# Patient Record
Sex: MALE | Race: WHITE | Employment: FULL TIME | URBAN - METROPOLITAN AREA
[De-identification: names, ages, dates, MRNs, and addresses within clinical notes are randomized per-mention and may not be internally consistent; named-entity substitution may affect disease eponyms.]

---

## 2018-11-12 ENCOUNTER — TRANSFERRED RECORDS (OUTPATIENT)
Dept: HEALTH INFORMATION MANAGEMENT | Facility: CLINIC | Age: 38
End: 2018-11-12

## 2019-07-31 ENCOUNTER — HOSPITAL ENCOUNTER (OUTPATIENT)
Facility: CLINIC | Age: 39
Discharge: HOME | End: 2019-07-31
Attending: PHYSICIAN ASSISTANT | Admitting: SURGERY
Payer: COMMERCIAL

## 2019-07-31 ENCOUNTER — ANESTHESIA (OUTPATIENT)
Dept: SURGERY | Facility: CLINIC | Age: 39
End: 2019-07-31
Payer: COMMERCIAL

## 2019-07-31 ENCOUNTER — APPOINTMENT (OUTPATIENT)
Dept: CT IMAGING | Facility: CLINIC | Age: 39
End: 2019-07-31
Attending: PHYSICIAN ASSISTANT
Payer: COMMERCIAL

## 2019-07-31 ENCOUNTER — ANESTHESIA EVENT (OUTPATIENT)
Dept: SURGERY | Facility: CLINIC | Age: 39
End: 2019-07-31
Payer: COMMERCIAL

## 2019-07-31 VITALS
OXYGEN SATURATION: 100 % | DIASTOLIC BLOOD PRESSURE: 70 MMHG | RESPIRATION RATE: 18 BRPM | WEIGHT: 182 LBS | HEART RATE: 65 BPM | SYSTOLIC BLOOD PRESSURE: 105 MMHG | TEMPERATURE: 98 F

## 2019-07-31 DIAGNOSIS — K35.30 ACUTE APPENDICITIS WITH LOCALIZED PERITONITIS, WITHOUT PERFORATION, ABSCESS, OR GANGRENE: Primary | ICD-10-CM

## 2019-07-31 LAB
ALBUMIN UR-MCNC: NEGATIVE MG/DL
ANION GAP SERPL CALCULATED.3IONS-SCNC: 6 MMOL/L (ref 3–14)
APPEARANCE UR: CLEAR
BASOPHILS # BLD AUTO: 0 10E9/L (ref 0–0.2)
BASOPHILS NFR BLD AUTO: 0.2 %
BILIRUB UR QL STRIP: NEGATIVE
BUN SERPL-MCNC: 17 MG/DL (ref 7–30)
CALCIUM SERPL-MCNC: 9.2 MG/DL (ref 8.5–10.1)
CHLORIDE SERPL-SCNC: 104 MMOL/L (ref 94–109)
CO2 SERPL-SCNC: 30 MMOL/L (ref 20–32)
COLOR UR AUTO: ABNORMAL
CREAT SERPL-MCNC: 1.15 MG/DL (ref 0.66–1.25)
DIFFERENTIAL METHOD BLD: ABNORMAL
EOSINOPHIL # BLD AUTO: 0 10E9/L (ref 0–0.7)
EOSINOPHIL NFR BLD AUTO: 0.2 %
ERYTHROCYTE [DISTWIDTH] IN BLOOD BY AUTOMATED COUNT: 11.8 % (ref 10–15)
GFR SERPL CREATININE-BSD FRML MDRD: 78 ML/MIN/{1.73_M2}
GLUCOSE SERPL-MCNC: 94 MG/DL (ref 70–99)
GLUCOSE UR STRIP-MCNC: NEGATIVE MG/DL
HCT VFR BLD AUTO: 45.2 % (ref 40–53)
HGB BLD-MCNC: 15.5 G/DL (ref 13.3–17.7)
HGB UR QL STRIP: NEGATIVE
IMM GRANULOCYTES # BLD: 0.1 10E9/L (ref 0–0.4)
IMM GRANULOCYTES NFR BLD: 0.4 %
KETONES UR STRIP-MCNC: NEGATIVE MG/DL
LEUKOCYTE ESTERASE UR QL STRIP: NEGATIVE
LYMPHOCYTES # BLD AUTO: 1.7 10E9/L (ref 0.8–5.3)
LYMPHOCYTES NFR BLD AUTO: 13.9 %
MCH RBC QN AUTO: 30.3 PG (ref 26.5–33)
MCHC RBC AUTO-ENTMCNC: 34.3 G/DL (ref 31.5–36.5)
MCV RBC AUTO: 89 FL (ref 78–100)
MONOCYTES # BLD AUTO: 0.6 10E9/L (ref 0–1.3)
MONOCYTES NFR BLD AUTO: 5.1 %
MUCOUS THREADS #/AREA URNS LPF: PRESENT /LPF
NEUTROPHILS # BLD AUTO: 9.5 10E9/L (ref 1.6–8.3)
NEUTROPHILS NFR BLD AUTO: 80.2 %
NITRATE UR QL: NEGATIVE
NRBC # BLD AUTO: 0 10*3/UL
NRBC BLD AUTO-RTO: 0 /100
PH UR STRIP: 7 PH (ref 5–7)
PLATELET # BLD AUTO: 215 10E9/L (ref 150–450)
POTASSIUM SERPL-SCNC: 3.7 MMOL/L (ref 3.4–5.3)
RADIOLOGIST FLAGS: ABNORMAL
RBC # BLD AUTO: 5.11 10E12/L (ref 4.4–5.9)
RBC #/AREA URNS AUTO: <1 /HPF (ref 0–2)
SODIUM SERPL-SCNC: 140 MMOL/L (ref 133–144)
SOURCE: ABNORMAL
SP GR UR STRIP: 1.01 (ref 1–1.03)
UROBILINOGEN UR STRIP-MCNC: NORMAL MG/DL (ref 0–2)
WBC # BLD AUTO: 11.9 10E9/L (ref 4–11)
WBC #/AREA URNS AUTO: 2 /HPF (ref 0–5)

## 2019-07-31 PROCEDURE — 71000027 ZZH RECOVERY PHASE 2 EACH 15 MINS: Performed by: SURGERY

## 2019-07-31 PROCEDURE — 74177 CT ABD & PELVIS W/CONTRAST: CPT

## 2019-07-31 PROCEDURE — 25800030 ZZH RX IP 258 OP 636: Performed by: ANESTHESIOLOGY

## 2019-07-31 PROCEDURE — 25000125 ZZHC RX 250: Performed by: NURSE ANESTHETIST, CERTIFIED REGISTERED

## 2019-07-31 PROCEDURE — 99204 OFFICE O/P NEW MOD 45 MIN: CPT | Mod: 57 | Performed by: SURGERY

## 2019-07-31 PROCEDURE — 25000128 H RX IP 250 OP 636: Performed by: SURGERY

## 2019-07-31 PROCEDURE — 85025 COMPLETE CBC W/AUTO DIFF WBC: CPT | Performed by: PHYSICIAN ASSISTANT

## 2019-07-31 PROCEDURE — 36000058 ZZH SURGERY LEVEL 3 EA 15 ADDTL MIN: Performed by: SURGERY

## 2019-07-31 PROCEDURE — 96375 TX/PRO/DX INJ NEW DRUG ADDON: CPT

## 2019-07-31 PROCEDURE — 36000056 ZZH SURGERY LEVEL 3 1ST 30 MIN: Performed by: SURGERY

## 2019-07-31 PROCEDURE — 37000008 ZZH ANESTHESIA TECHNICAL FEE, 1ST 30 MIN: Performed by: SURGERY

## 2019-07-31 PROCEDURE — 25000132 ZZH RX MED GY IP 250 OP 250 PS 637: Performed by: SURGERY

## 2019-07-31 PROCEDURE — 80048 BASIC METABOLIC PNL TOTAL CA: CPT | Performed by: PHYSICIAN ASSISTANT

## 2019-07-31 PROCEDURE — 25000128 H RX IP 250 OP 636: Performed by: ANESTHESIOLOGY

## 2019-07-31 PROCEDURE — 25000128 H RX IP 250 OP 636: Performed by: NURSE ANESTHETIST, CERTIFIED REGISTERED

## 2019-07-31 PROCEDURE — 88304 TISSUE EXAM BY PATHOLOGIST: CPT | Mod: 26 | Performed by: SURGERY

## 2019-07-31 PROCEDURE — 27210794 ZZH OR GENERAL SUPPLY STERILE: Performed by: SURGERY

## 2019-07-31 PROCEDURE — 81001 URINALYSIS AUTO W/SCOPE: CPT | Performed by: EMERGENCY MEDICINE

## 2019-07-31 PROCEDURE — 96374 THER/PROPH/DIAG INJ IV PUSH: CPT | Mod: 59

## 2019-07-31 PROCEDURE — 71000012 ZZH RECOVERY PHASE 1 LEVEL 1 FIRST HR: Performed by: SURGERY

## 2019-07-31 PROCEDURE — 99285 EMERGENCY DEPT VISIT HI MDM: CPT | Mod: 25

## 2019-07-31 PROCEDURE — 88304 TISSUE EXAM BY PATHOLOGIST: CPT | Performed by: SURGERY

## 2019-07-31 PROCEDURE — 40000306 ZZH STATISTIC PRE PROC ASSESS II: Performed by: SURGERY

## 2019-07-31 PROCEDURE — 25000128 H RX IP 250 OP 636: Performed by: PHYSICIAN ASSISTANT

## 2019-07-31 PROCEDURE — 44970 LAPAROSCOPY APPENDECTOMY: CPT | Performed by: SURGERY

## 2019-07-31 PROCEDURE — 37000009 ZZH ANESTHESIA TECHNICAL FEE, EACH ADDTL 15 MIN: Performed by: SURGERY

## 2019-07-31 RX ORDER — HYDRALAZINE HYDROCHLORIDE 20 MG/ML
2.5-5 INJECTION INTRAMUSCULAR; INTRAVENOUS EVERY 10 MIN PRN
Status: DISCONTINUED | OUTPATIENT
Start: 2019-07-31 | End: 2019-07-31 | Stop reason: HOSPADM

## 2019-07-31 RX ORDER — METOCLOPRAMIDE HYDROCHLORIDE 5 MG/ML
10 INJECTION INTRAMUSCULAR; INTRAVENOUS EVERY 6 HOURS PRN
Status: DISCONTINUED | OUTPATIENT
Start: 2019-07-31 | End: 2019-07-31 | Stop reason: HOSPADM

## 2019-07-31 RX ORDER — KETOROLAC TROMETHAMINE 30 MG/ML
30 INJECTION, SOLUTION INTRAMUSCULAR; INTRAVENOUS EVERY 6 HOURS PRN
Status: DISCONTINUED | OUTPATIENT
Start: 2019-07-31 | End: 2019-07-31 | Stop reason: HOSPADM

## 2019-07-31 RX ORDER — MEPERIDINE HYDROCHLORIDE 25 MG/ML
12.5 INJECTION INTRAMUSCULAR; INTRAVENOUS; SUBCUTANEOUS
Status: DISCONTINUED | OUTPATIENT
Start: 2019-07-31 | End: 2019-07-31 | Stop reason: HOSPADM

## 2019-07-31 RX ORDER — GLYCOPYRROLATE 0.2 MG/ML
INJECTION, SOLUTION INTRAMUSCULAR; INTRAVENOUS PRN
Status: DISCONTINUED | OUTPATIENT
Start: 2019-07-31 | End: 2019-07-31

## 2019-07-31 RX ORDER — IOPAMIDOL 755 MG/ML
100 INJECTION, SOLUTION INTRAVASCULAR ONCE
Status: COMPLETED | OUTPATIENT
Start: 2019-07-31 | End: 2019-07-31

## 2019-07-31 RX ORDER — ONDANSETRON 2 MG/ML
4 INJECTION INTRAMUSCULAR; INTRAVENOUS EVERY 30 MIN PRN
Status: DISCONTINUED | OUTPATIENT
Start: 2019-07-31 | End: 2019-07-31 | Stop reason: HOSPADM

## 2019-07-31 RX ORDER — NEOSTIGMINE METHYLSULFATE 1 MG/ML
VIAL (ML) INJECTION PRN
Status: DISCONTINUED | OUTPATIENT
Start: 2019-07-31 | End: 2019-07-31

## 2019-07-31 RX ORDER — IBUPROFEN 800 MG/1
800 TABLET, FILM COATED ORAL EVERY 6 HOURS PRN
Qty: 30 TABLET | Refills: 0 | Status: SHIPPED | OUTPATIENT
Start: 2019-07-31 | End: 2019-12-10

## 2019-07-31 RX ORDER — HEPARIN SODIUM 5000 [USP'U]/.5ML
5000 INJECTION, SOLUTION INTRAVENOUS; SUBCUTANEOUS
Status: DISCONTINUED | OUTPATIENT
Start: 2019-07-31 | End: 2019-07-31 | Stop reason: HOSPADM

## 2019-07-31 RX ORDER — AMOXICILLIN 250 MG
1 CAPSULE ORAL 2 TIMES DAILY
Qty: 30 TABLET | Refills: 0 | Status: SHIPPED | OUTPATIENT
Start: 2019-07-31 | End: 2019-11-14

## 2019-07-31 RX ORDER — SODIUM CHLORIDE, SODIUM LACTATE, POTASSIUM CHLORIDE, CALCIUM CHLORIDE 600; 310; 30; 20 MG/100ML; MG/100ML; MG/100ML; MG/100ML
INJECTION, SOLUTION INTRAVENOUS CONTINUOUS
Status: DISCONTINUED | OUTPATIENT
Start: 2019-07-31 | End: 2019-07-31 | Stop reason: HOSPADM

## 2019-07-31 RX ORDER — CEFOXITIN 2 G/1
2 INJECTION, POWDER, FOR SOLUTION INTRAVENOUS ONCE
Status: COMPLETED | OUTPATIENT
Start: 2019-07-31 | End: 2019-07-31

## 2019-07-31 RX ORDER — ONDANSETRON 2 MG/ML
INJECTION INTRAMUSCULAR; INTRAVENOUS PRN
Status: DISCONTINUED | OUTPATIENT
Start: 2019-07-31 | End: 2019-07-31

## 2019-07-31 RX ORDER — FENTANYL CITRATE 50 UG/ML
25-50 INJECTION, SOLUTION INTRAMUSCULAR; INTRAVENOUS
Status: DISCONTINUED | OUTPATIENT
Start: 2019-07-31 | End: 2019-07-31 | Stop reason: HOSPADM

## 2019-07-31 RX ORDER — BUPIVACAINE HYDROCHLORIDE 5 MG/ML
INJECTION, SOLUTION EPIDURAL; INTRACAUDAL PRN
Status: DISCONTINUED | OUTPATIENT
Start: 2019-07-31 | End: 2019-07-31 | Stop reason: HOSPADM

## 2019-07-31 RX ORDER — LIDOCAINE HYDROCHLORIDE 10 MG/ML
INJECTION, SOLUTION INFILTRATION; PERINEURAL PRN
Status: DISCONTINUED | OUTPATIENT
Start: 2019-07-31 | End: 2019-07-31

## 2019-07-31 RX ORDER — HYDROMORPHONE HYDROCHLORIDE 1 MG/ML
.3-.5 INJECTION, SOLUTION INTRAMUSCULAR; INTRAVENOUS; SUBCUTANEOUS EVERY 10 MIN PRN
Status: DISCONTINUED | OUTPATIENT
Start: 2019-07-31 | End: 2019-07-31 | Stop reason: HOSPADM

## 2019-07-31 RX ORDER — ONDANSETRON 2 MG/ML
4 INJECTION INTRAMUSCULAR; INTRAVENOUS ONCE
Status: COMPLETED | OUTPATIENT
Start: 2019-07-31 | End: 2019-07-31

## 2019-07-31 RX ORDER — KETOROLAC TROMETHAMINE 30 MG/ML
30 INJECTION, SOLUTION INTRAMUSCULAR; INTRAVENOUS ONCE
Status: DISCONTINUED | OUTPATIENT
Start: 2019-07-31 | End: 2019-07-31 | Stop reason: HOSPADM

## 2019-07-31 RX ORDER — ACETAMINOPHEN 325 MG/1
975 TABLET ORAL ONCE
Status: DISCONTINUED | OUTPATIENT
Start: 2019-07-31 | End: 2019-07-31 | Stop reason: HOSPADM

## 2019-07-31 RX ORDER — ONDANSETRON 4 MG/1
4 TABLET, ORALLY DISINTEGRATING ORAL EVERY 30 MIN PRN
Status: DISCONTINUED | OUTPATIENT
Start: 2019-07-31 | End: 2019-07-31 | Stop reason: HOSPADM

## 2019-07-31 RX ORDER — PROPOFOL 10 MG/ML
INJECTION, EMULSION INTRAVENOUS PRN
Status: DISCONTINUED | OUTPATIENT
Start: 2019-07-31 | End: 2019-07-31

## 2019-07-31 RX ORDER — METOCLOPRAMIDE 10 MG/1
10 TABLET ORAL EVERY 6 HOURS PRN
Status: DISCONTINUED | OUTPATIENT
Start: 2019-07-31 | End: 2019-07-31 | Stop reason: HOSPADM

## 2019-07-31 RX ORDER — DEXAMETHASONE SODIUM PHOSPHATE 4 MG/ML
4 INJECTION, SOLUTION INTRA-ARTICULAR; INTRALESIONAL; INTRAMUSCULAR; INTRAVENOUS; SOFT TISSUE EVERY 10 MIN PRN
Status: DISCONTINUED | OUTPATIENT
Start: 2019-07-31 | End: 2019-07-31 | Stop reason: HOSPADM

## 2019-07-31 RX ORDER — OXYCODONE HYDROCHLORIDE 5 MG/1
5-10 TABLET ORAL
Qty: 12 TABLET | Refills: 0 | Status: SHIPPED | OUTPATIENT
Start: 2019-07-31 | End: 2019-11-14

## 2019-07-31 RX ORDER — HYDROMORPHONE HYDROCHLORIDE 1 MG/ML
0.5 INJECTION, SOLUTION INTRAMUSCULAR; INTRAVENOUS; SUBCUTANEOUS ONCE
Status: COMPLETED | OUTPATIENT
Start: 2019-07-31 | End: 2019-07-31

## 2019-07-31 RX ORDER — OXYCODONE HYDROCHLORIDE 5 MG/1
10 TABLET ORAL
Status: COMPLETED | OUTPATIENT
Start: 2019-07-31 | End: 2019-07-31

## 2019-07-31 RX ORDER — DIMENHYDRINATE 50 MG/ML
25 INJECTION, SOLUTION INTRAMUSCULAR; INTRAVENOUS
Status: DISCONTINUED | OUTPATIENT
Start: 2019-07-31 | End: 2019-07-31 | Stop reason: HOSPADM

## 2019-07-31 RX ORDER — DEXAMETHASONE SODIUM PHOSPHATE 4 MG/ML
INJECTION, SOLUTION INTRA-ARTICULAR; INTRALESIONAL; INTRAMUSCULAR; INTRAVENOUS; SOFT TISSUE PRN
Status: DISCONTINUED | OUTPATIENT
Start: 2019-07-31 | End: 2019-07-31

## 2019-07-31 RX ORDER — ACETAMINOPHEN 325 MG/1
975 TABLET ORAL EVERY 6 HOURS
Qty: 36 TABLET | Refills: 0 | Status: SHIPPED | OUTPATIENT
Start: 2019-07-31 | End: 2019-12-10

## 2019-07-31 RX ORDER — NALOXONE HYDROCHLORIDE 0.4 MG/ML
.1-.4 INJECTION, SOLUTION INTRAMUSCULAR; INTRAVENOUS; SUBCUTANEOUS
Status: DISCONTINUED | OUTPATIENT
Start: 2019-07-31 | End: 2019-07-31 | Stop reason: HOSPADM

## 2019-07-31 RX ORDER — FENTANYL CITRATE 50 UG/ML
INJECTION, SOLUTION INTRAMUSCULAR; INTRAVENOUS PRN
Status: DISCONTINUED | OUTPATIENT
Start: 2019-07-31 | End: 2019-07-31

## 2019-07-31 RX ADMIN — HYDROMORPHONE HYDROCHLORIDE 0.5 MG: 1 INJECTION, SOLUTION INTRAMUSCULAR; INTRAVENOUS; SUBCUTANEOUS at 15:43

## 2019-07-31 RX ADMIN — CEFOXITIN 2 G: 2 INJECTION, POWDER, FOR SOLUTION INTRAVENOUS at 17:24

## 2019-07-31 RX ADMIN — OXYCODONE HYDROCHLORIDE 10 MG: 5 TABLET ORAL at 20:12

## 2019-07-31 RX ADMIN — PROPOFOL 200 MG: 10 INJECTION, EMULSION INTRAVENOUS at 18:37

## 2019-07-31 RX ADMIN — MIDAZOLAM 2 MG: 1 INJECTION INTRAMUSCULAR; INTRAVENOUS at 18:25

## 2019-07-31 RX ADMIN — FENTANYL CITRATE 100 MCG: 50 INJECTION, SOLUTION INTRAMUSCULAR; INTRAVENOUS at 18:37

## 2019-07-31 RX ADMIN — ONDANSETRON HYDROCHLORIDE 4 MG: 2 INJECTION, SOLUTION INTRAMUSCULAR; INTRAVENOUS at 15:43

## 2019-07-31 RX ADMIN — IOPAMIDOL 92 ML: 755 INJECTION, SOLUTION INTRAVENOUS at 16:16

## 2019-07-31 RX ADMIN — KETOROLAC TROMETHAMINE 30 MG: 30 INJECTION, SOLUTION INTRAMUSCULAR at 18:45

## 2019-07-31 RX ADMIN — GLYCOPYRROLATE 0.2 MG: 0.2 INJECTION, SOLUTION INTRAMUSCULAR; INTRAVENOUS at 18:37

## 2019-07-31 RX ADMIN — DEXAMETHASONE SODIUM PHOSPHATE 4 MG: 4 INJECTION, SOLUTION INTRA-ARTICULAR; INTRALESIONAL; INTRAMUSCULAR; INTRAVENOUS; SOFT TISSUE at 18:37

## 2019-07-31 RX ADMIN — LIDOCAINE HYDROCHLORIDE 50 MG: 10 INJECTION, SOLUTION INFILTRATION; PERINEURAL at 18:37

## 2019-07-31 RX ADMIN — SODIUM CHLORIDE 63 ML: 9 INJECTION, SOLUTION INTRAVENOUS at 16:16

## 2019-07-31 RX ADMIN — GLYCOPYRROLATE 0.4 MG: 0.2 INJECTION, SOLUTION INTRAMUSCULAR; INTRAVENOUS at 19:11

## 2019-07-31 RX ADMIN — Medication 3 MG: at 19:11

## 2019-07-31 RX ADMIN — ROCURONIUM BROMIDE 50 MG: 10 INJECTION INTRAVENOUS at 18:37

## 2019-07-31 RX ADMIN — ONDANSETRON 4 MG: 2 INJECTION INTRAMUSCULAR; INTRAVENOUS at 19:11

## 2019-07-31 RX ADMIN — SODIUM CHLORIDE, POTASSIUM CHLORIDE, SODIUM LACTATE AND CALCIUM CHLORIDE: 600; 310; 30; 20 INJECTION, SOLUTION INTRAVENOUS at 18:25

## 2019-07-31 SDOH — HEALTH STABILITY: MENTAL HEALTH: HOW OFTEN DO YOU HAVE A DRINK CONTAINING ALCOHOL?: 2-4 TIMES A MONTH

## 2019-07-31 SDOH — HEALTH STABILITY: MENTAL HEALTH: HOW OFTEN DO YOU HAVE 6 OR MORE DRINKS ON ONE OCCASION?: NEVER

## 2019-07-31 SDOH — HEALTH STABILITY: MENTAL HEALTH: HOW MANY STANDARD DRINKS CONTAINING ALCOHOL DO YOU HAVE ON A TYPICAL DAY?: 3 OR 4

## 2019-07-31 ASSESSMENT — ENCOUNTER SYMPTOMS
DYSURIA: 1
DIZZINESS: 0
ABDOMINAL PAIN: 1
DIAPHORESIS: 1
NAUSEA: 1
SHORTNESS OF BREATH: 0
HEMATURIA: 0
VOMITING: 0
FEVER: 0
CONSTIPATION: 0

## 2019-07-31 NOTE — H&P
[facility]    History and Physical  Surgery     Date of Admission:  7/31/2019    Assessment & Plan   Surya Cyr is a 39 year old male who presents with abdominal pain    Principal Problem:    Acute appendicitis with localized peritonitis, without perforation, abscess, or gangrene    Assessment: stable    Plan: NPO   IVF   2 g cefoxitin   Sub q heparin   975mg tylenol   30 mg Toradol     Patient to the OR for laparoscopic appendectomy; possible open.  Risks and benefits of the procedure discussed in detail with the patient in the presence of his wife.  Consent signed.       Jamir Lopez D.O.     Code Status   Full Code    Primary Care Physician   Physician No Ref-Primary    Chief Complaint   RLQ Abdominal pain    History is obtained from the patient    History of Present Illness   39-year-old  male presented to the clinic with a 1 day history of worsening 4-6 out of 10 sharp, cramping, right lower quadrant abdominal pain associated with nausea without emesis.  Patient denies any fevers but does report report some chills.  The pain started last night around 9:30 PM and slowly progressed.  He did go to work today, but left early secondary to the worsening discomfort.  He has never had this before.  Nothing seems to make it worse.  Pain medications received in the ED did help.    Past Medical History    I have reviewed this patient's medical history and updated it with pertinent information if needed.   History reviewed. No pertinent past medical history.    Past Surgical History   I have reviewed this patient's surgical history and updated it with pertinent information if needed.  Past Surgical History:   Procedure Laterality Date     VASECTOMY         Prior to Admission Medications   None     Allergies   No Known Allergies    Social History   I have reviewed this patient's social history and updated it with pertinent information if needed. Surya Cyr  reports that he has  never smoked. He has never used smokeless tobacco. He reports that he drinks alcohol. He reports that he does not use drugs.    Family History   I have reviewed this patient's family history and updated it with pertinent information if needed.   History reviewed. No pertinent family history.    Review of Systems   The 10 point Review of Systems is negative other than noted in the HPI.    Physical Exam   Temp: 98.8  F (37.1  C) Temp src: Temporal BP: 124/84 Pulse: 82   Resp: 16 SpO2: 97 %      Vital Signs with Ranges  Temp:  [98.8  F (37.1  C)] 98.8  F (37.1  C)  Pulse:  [82] 82  Resp:  [16] 16  BP: (124)/(84) 124/84  SpO2:  [97 %] 97 %  182 lbs 0 oz  There is no height or weight on file to calculate BMI.    Constitutional: awake, alert, cooperative, no apparent distress, and appears stated age  Eyes: Lids and lashes normal, pupils equal, round and reactive to light, extra ocular muscles intact, sclera clear, conjunctiva normal  ENT: Normocephalic, without obvious abnormality, atraumatic,   Respiratory: No increased work of breathing, good air exchange,   Cardiovascular:  regular rate and rhythm,   GI: soft, non-distended, RLQ rebound tenderness present, voluntary guarding present, no masses palpated, no hepatosplenomegally and hernia absent  Genitounirinary: deferred  Skin: no bruising or bleeding, normal skin color, texture, turgor and no redness, warmth, or swelling  Musculoskeletal: There is no redness, warmth, or swelling of the joints.  Full range of motion noted.  Motor strength is 5 out of 5 all extremities bilaterally.  Tone is normal.  Neurologic: Awake, alert, oriented to name, place and time.  Cranial nerves II-XII are grossly intact.  Motor is 5 out of 5 bilaterally. .  Neuropsychiatric: General: normal, calm and normal eye contact    Data   Results for orders placed or performed during the hospital encounter of 07/31/19 (from the past 24 hour(s))   UA with Microscopic   Result Value Ref Range    Color  Urine Straw     Appearance Urine Clear     Glucose Urine Negative NEG^Negative mg/dL    Bilirubin Urine Negative NEG^Negative    Ketones Urine Negative NEG^Negative mg/dL    Specific Gravity Urine 1.011 1.003 - 1.035    Blood Urine Negative NEG^Negative    pH Urine 7.0 5.0 - 7.0 pH    Protein Albumin Urine Negative NEG^Negative mg/dL    Urobilinogen mg/dL Normal 0.0 - 2.0 mg/dL    Nitrite Urine Negative NEG^Negative    Leukocyte Esterase Urine Negative NEG^Negative    Source Midstream Urine     WBC Urine 2 0 - 5 /HPF    RBC Urine <1 0 - 2 /HPF    Mucous Urine Present (A) NEG^Negative /LPF   CBC with platelets differential   Result Value Ref Range    WBC 11.9 (H) 4.0 - 11.0 10e9/L    RBC Count 5.11 4.4 - 5.9 10e12/L    Hemoglobin 15.5 13.3 - 17.7 g/dL    Hematocrit 45.2 40.0 - 53.0 %    MCV 89 78 - 100 fl    MCH 30.3 26.5 - 33.0 pg    MCHC 34.3 31.5 - 36.5 g/dL    RDW 11.8 10.0 - 15.0 %    Platelet Count 215 150 - 450 10e9/L    Diff Method Automated Method     % Neutrophils 80.2 %    % Lymphocytes 13.9 %    % Monocytes 5.1 %    % Eosinophils 0.2 %    % Basophils 0.2 %    % Immature Granulocytes 0.4 %    Nucleated RBCs 0 0 /100    Absolute Neutrophil 9.5 (H) 1.6 - 8.3 10e9/L    Absolute Lymphocytes 1.7 0.8 - 5.3 10e9/L    Absolute Monocytes 0.6 0.0 - 1.3 10e9/L    Absolute Eosinophils 0.0 0.0 - 0.7 10e9/L    Absolute Basophils 0.0 0.0 - 0.2 10e9/L    Abs Immature Granulocytes 0.1 0 - 0.4 10e9/L    Absolute Nucleated RBC 0.0    Basic metabolic panel   Result Value Ref Range    Sodium 140 133 - 144 mmol/L    Potassium 3.7 3.4 - 5.3 mmol/L    Chloride 104 94 - 109 mmol/L    Carbon Dioxide 30 20 - 32 mmol/L    Anion Gap 6 3 - 14 mmol/L    Glucose 94 70 - 99 mg/dL    Urea Nitrogen 17 7 - 30 mg/dL    Creatinine 1.15 0.66 - 1.25 mg/dL    GFR Estimate 78 >60 mL/min/[1.73_m2]    GFR Estimate If Black >90 >60 mL/min/[1.73_m2]    Calcium 9.2 8.5 - 10.1 mg/dL   CT Abdomen Pelvis w Contrast   Result Value Ref Range    Radiologist  flags Appendicitis (AA)     Narrative    CT ABDOMEN AND PELVIS WITH CONTRAST  7/31/2019 4:23 PM     HISTORY: Nausea, vomiting; ; Right lower quadrant tenderness, assess  for appendicitis    TECHNIQUE:  CT abdomen and pelvis with 92mL Isovue-370 IV. Radiation  dose for this scan was reduced using automated exposure control,  adjustment of the mA and/or kV according to patient size, or iterative  reconstruction technique.    COMPARISON: None available    FINDINGS:   Mild subsegmental atelectasis seen in the lung bases.    Several hypoattenuating liver lesions are present, measuring up to 1.4  cm in the right hepatic lobe (series 2, image 20). No intrahepatic or  extrahepatic bile duct dilatation is present.    The spleen, adrenal glands and pancreas are unremarkable. Kidneys  enhance much of it. No hydronephrosis is seen.    Stomach is mildly distended with ingested contents and air. Small and  large bowel loops are not dilated. Scattered colonic diverticulosis  without evidence of diverticulitis. Enhancing, tubular structure is  noted to arise from the cecum located within the lower pelvis with a  blind ending tube (series 3, image 22 and series 3, image 30). This  structure suspected to represent the appendix and it is dilated  measuring up to 1.1 cm proximally. No adjacent fluid collection is  seen. Abdominal aorta and IVC are of normal course and caliber. No  retroperitoneal or mesenteric lymphadenopathy is seen.    Urinary bladder is nondistended and unremarkable. Prostate gland is  not enlarged. No pelvic lymphadenopathy seen.     No suspicious osseous lesions are seen.      Impression    IMPRESSION:  1.  Findings suspicious for acute appendicitis with dilated and  enhancing tubular structure arising from the cecum likely representing  the appendix seen within the lower pelvis. No adjacent focal fluid  collection is seen.  2.  Several indeterminate hypoattenuating liver lesion seen measuring  up to 1.4 cm and  the right hepatic lobe. Consider further evaluation  with liver MRI for further evaluation.    [Critical Result: Appendicitis]    Finding was identified on 7/31/2019 4:26 PM.     MARGARITA Lawson was contacted by me on 7/31/2019 4:36 PM and  verbalized understanding of the critical result.      RACHEL WYATT MD

## 2019-07-31 NOTE — ED PROVIDER NOTES
History     Chief Complaint:  Abdominal Pain    HPI   Surya Cyr is a 39 year old otherwise healthy male, with no personal or family history of cardiac issues, who presents with his wife for evaluation of achy, intermittently sharp non-radiating right lower quadrant abdominal pain that began last night at 2130 prior to going to bed and worsened throughout the night. Patient was then prompted to present to Urgent Care, but ultimately referred to the ED.    Here in the ED, he also endorsed some mild nausea and noted he felt diaphoretic last night. He also reported some mild dysuria. He denies any vomiting, diarrhea, hematuria, constipation, penile pain, testicular pain, chest pain, shortness of breath, dizziness or fever. Last PO intake was at 1200 today. Patient has not taken any interventions for his pain.     Allergies:  No Known Drug Allergies      Medications:    The patient is not currently taking any prescribed medications.     Past Medical History:    History reviewed. No pertinent past medical history.     Past Surgical History:    History reviewed. No pertinent past surgical history.     Family History:    The patient denies any relevant family medical history.     Social History:  The patient was accompanied to the ED by wife.  Smoking Status: N/A  Smokeless Tobacco: N/A  Alcohol Use: N/A  Drug Use: N/A   Marital Status:       Review of Systems   Constitutional: Positive for diaphoresis. Negative for fever.   Respiratory: Negative for shortness of breath.    Cardiovascular: Negative for chest pain.   Gastrointestinal: Positive for abdominal pain and nausea. Negative for constipation and vomiting.   Genitourinary: Positive for dysuria. Negative for hematuria, penile pain and testicular pain.   Neurological: Negative for dizziness.   All other systems reviewed and are negative.    Physical Exam   Vitals:  Patient Vitals for the past 24 hrs:   BP Temp Temp src Pulse Resp SpO2 Weight   07/31/19  1357 124/84 98.8  F (37.1  C) Temporal 82 16 97 % 82.6 kg (182 lb)      Physical Exam  General: Alert and interactive. Appears well. Cooperative and pleasant.   Eyes: The pupils are equal and round. EOMs intact. No scleral icterus.  ENT: No abnormalities to the external nose or ears. Mucous membranes moist. Posterior oropharynx is non-erythematous.  Neck: Trachea is in the midline. No nuchal rigidity.   CV: Regular rate and rhythm. S1 and S2 normal without murmur, click, gallop or rub.   Resp: Breath sounds are clear bilaterally, without rhonchi, wheezes, rales. Non-labored, no retractions or accessory muscle use.     GI: Abdomen is soft without distension. Tenderness to palpation in the left and right low quadrant with guarding bilaterally. No CVA tenderness. Negative Rovsing. No peritoneal signs.    MS: Moving all extremities well. Good muscle tone.   Skin: Warm and dry. No rash or lesions noted.  Neuro: Alert and oriented x 3. No focal neurologic deficits. Good strength and sensation in upper and lower extremities.    Psych: Awake. Alert.  Normal affect. Appropriate interactions.  Lymph: No anterior or posterior cervical lymphadenopathy noted.     Emergency Department Course     Imaging:  Radiology findings were communicated with the patient and family who voiced understanding of the findings.  CT Abdomen Pelvis w Contrast:  IMPRESSION:  1.  Findings suspicious for acute appendicitis with dilated and  enhancing tubular structure arising from the cecum likely representing  the appendix seen within the lower pelvis. No adjacent focal fluid  collection is seen.  2.  Several indeterminate hypoattenuating liver lesion seen measuring  up to 1.4 cm and the right hepatic lobe. Consider further evaluation  with liver MRI for further evaluation.  Reading per radiology.     Laboratory:  Laboratory findings were communicated with the patient and family who voiced understanding of the findings.  UA with Microscopic: Mucous  Urine Present (A) o/w WNL     CBC: WBC 11.9 (H) o/w WNL (HGB 15.5, )  BMP: AWNL (Creatinine 1.15)     Interventions:  1543 Dilaudid 0.5 mg IV  1543 Zofran 4 mg IV  1724 Mefoxin 2 g IV     Emergency Department Course:  Nursing notes and vitals reviewed.  The patient provided a urine sample here in the emergency department. This was sent for laboratory testing, findings above.   The patient was sent for a CT Abdomen Pelvis w Contrast while in the emergency department, results above.    IV was inserted and blood was drawn for laboratory testing, results above.     (1521)   I performed an exam of the patient as documented above. History obtained from patient.    (1640)   I spoke with Radiology service regarding patient's presentation, findings, and plan of care.     (1646)   Updated patient/spouse regarding results.     (1650)   I spoke with Dr. Butler of the General Surgery service regarding patient's presentation, findings, and plan of care. Requested we call the oncoming on-call surgeon.     (1718)    I spoke with Dr. Miranda of the General Surgery service regarding patient's presentation, findings, and plan of care. They have agreed to accept patient to the OR.    I personally reviewed the laboratory results with the Patient and spouse and answered all related questions prior to transferring to the OR.    Impression & Plan      Medical Decision Making:  Surya Cyr presented with lower abdominal pain with associated nausea that has been gradually worsening since 2130 last night; CT scan confirms appendicitis. There is no evidence of rupture or abscess at this time. Pain has been controlled with interventions in the Emergency Department.  Parenteral antibiotics have been administered in the Emergency Department, in anticipation of surgery. The case was discussed with the on call surgeon and he will be going to the operating room for appendectomy. Patient's questions were answered.     Diagnosis:     ICD-10-CM    1. Acute appendicitis with localized peritonitis, without perforation, abscess, or gangrene K35.30         Disposition:   Transfer to the OR    Scribe Disclosure:  I, Joelle Yun, am serving as a scribe at 3:09 PM on 7/31/2019 to document services personally performed by Nanci Jimenez, *, based on my observations and the provider's statements to me.  7/31/2019   [facility] EMERGENCY DEPARTMENT       Nanci Jimenez PA-C  07/31/19 2232

## 2019-07-31 NOTE — ANESTHESIA PREPROCEDURE EVALUATION
Anesthesia Pre-Procedure Evaluation    Patient: Surya Cyr   MRN: [id number] : 1980          Preoperative Diagnosis: appendix    Procedure(s):  APPENDECTOMY, LAPAROSCOPIC    History reviewed. No pertinent past medical history.  Past Surgical History:   Procedure Laterality Date     VASECTOMY       Anesthesia Evaluation     . Pt has had prior anesthetic. Type: MAC           ROS/MED HX    ENT/Pulmonary:  - neg pulmonary ROS     Neurologic:  - neg neurologic ROS     Cardiovascular:         METS/Exercise Tolerance:     Hematologic:  - neg hematologic  ROS       Musculoskeletal:  - neg musculoskeletal ROS       GI/Hepatic:     (+) appendicitis,       Renal/Genitourinary:  - ROS Renal section negative       Endo:  - neg endo ROS       Psychiatric:  - neg psychiatric ROS       Infectious Disease:  - neg infectious disease ROS       Malignancy:      - no malignancy   Other:    (+) No chance of pregnancy C-spine cleared: N/A, no H/O Chronic Pain,no other significant disability   - neg other ROS                      Physical Exam  Normal systems: cardiovascular, pulmonary and dental    Airway   Mallampati: I  TM distance: >3 FB  Neck ROM: full    Dental     Cardiovascular       Pulmonary             Lab Results   Component Value Date    WBC 11.9 (H) 2019    HGB 15.5 2019    HCT 45.2 2019     2019     2019    POTASSIUM 3.7 2019    CHLORIDE 104 2019    CO2 30 2019    BUN 17 2019    CR 1.15 2019    GLC 94 2019    MAYA 9.2 2019       Preop Vitals  BP Readings from Last 3 Encounters:   19 124/84    Pulse Readings from Last 3 Encounters:   19 82      Resp Readings from Last 3 Encounters:   19 16    SpO2 Readings from Last 3 Encounters:   19 97%      Temp Readings from Last 1 Encounters:   19 98.8  F (37.1  C) (Temporal)    Ht Readings from Last 1 Encounters:   No data found for Ht      Wt Readings from  Last 1 Encounters:   07/31/19 82.6 kg (182 lb)    There is no height or weight on file to calculate BMI.       Anesthesia Plan      History & Physical Review  History and physical reviewed and following examination; no interval change.    ASA Status:  1 .    NPO Status:  > 6 hours    Plan for General and ETT with Intravenous induction. Maintenance will be Balanced.    PONV prophylaxis:  Ondansetron (or other 5HT-3) and Dexamethasone or Solumedrol  RSI      Postoperative Care      Consents  Anesthetic plan, risks, benefits and alternatives discussed with:  Patient.  Use of blood products discussed: Yes.   Use of blood products discussed with Patient.  Consented to blood products.  .                 Raul Triplett MD                    .

## 2019-08-01 NOTE — BRIEF OP NOTE
[facility]    Brief Operative Note    Pre-operative diagnosis: appendix  Post-operative diagnosis acute appendicitis  Procedure: Procedure(s):  APPENDECTOMY, LAPAROSCOPIC  Surgeon: Surgeon(s) and Role:     * Jamir Lopez DO - Primary  Anesthesia: General   Estimated blood loss: Less than 10 ml  Drains: None  Specimens:   ID Type Source Tests Collected by Time Destination   A : APPENDIX Tissue Appendix SURGICAL PATHOLOGY EXAM Jamir Lopez DO 7/31/2019  6:57 PM      Findings:   acute appendicitis w/out perforation or abscess.  Complications: None.  Implants:  * No implants in log *

## 2019-08-01 NOTE — ANESTHESIA CARE TRANSFER NOTE
Patient: Surya Cyr    Procedure(s):  APPENDECTOMY, LAPAROSCOPIC    Diagnosis: appendix  Diagnosis Additional Information: No value filed.    Anesthesia Type:   General, ETT     Note:  Airway :Face Mask  Patient transferred to:PACU  Handoff Report: Identifed the Patient, Identified the Reponsible Provider, Reviewed the pertinent medical history, Discussed the surgical course, Reviewed Intra-OP anesthesia mangement and issues during anesthesia, Set expectations for post-procedure period and Allowed opportunity for questions and acknowledgement of understanding      Vitals: (Last set prior to Anesthesia Care Transfer)    CRNA VITALS  7/31/2019 1847 - 7/31/2019 1921      7/31/2019             Pulse:  77    SpO2:  100 %    Resp Rate (observed):  18                Electronically Signed By: HENNA Orourke CRNA  July 31, 2019  7:21 PM

## 2019-08-01 NOTE — ANESTHESIA POSTPROCEDURE EVALUATION
Patient: Surya Cyr    Procedure(s):  APPENDECTOMY, LAPAROSCOPIC    Diagnosis:appendix  Diagnosis Additional Information: No value filed.    Anesthesia Type:  General, ETT    Note:  Anesthesia Post Evaluation    Patient location during evaluation: PACU  Patient participation: Able to fully participate in evaluation  Level of consciousness: awake and alert  Pain management: adequate  Airway patency: patent  Cardiovascular status: acceptable  Respiratory status: acceptable  Hydration status: acceptable  PONV: controlled     Anesthetic complications: None          Last vitals:  Vitals:    07/31/19 1925 07/31/19 1930 07/31/19 1935   BP: 130/67 124/65 114/60   Pulse: 63 63 65   Resp: 8 15 8   Temp:      SpO2: 100% 100% 100%         Electronically Signed By: Raul Triplett MD  July 31, 2019  7:44 PM

## 2019-08-01 NOTE — OP NOTE
OPERATIVE NOTE  [facility] SURGERY    DATE:   7/31/2019    SURGEON:   Jamir Lopez D.O.    PRE-OPERATIVE DIAGNOSIS:   Acute appendicitis.    POSTOPERATIVE DIAGNOSIS:   Acute  appendicitis.     OPERATION:  Laparoscopic appendectomy.         ANESTHESIA:   General endotracheal.     INDICATIONS FOR PROCEDURE:   Surya Cyr is a 39 year old male who presented with abdominal pain.  Workup of this pain revealed acute appendicitis.  Based on this, laparoscopic appendectomy was recommended.    FINDINGS:   Acute appendicitis.    PROCEDURE:   The patient was brought to the operating room and placed in the supine position upon the operating table. Nursing and anesthesia assured proper positioning prior to the procedure. A time-out was taken to assure proper patient, site and procedure with all in the operating room.       A 5 mm infraumbilical incision was made and a 5 mm optiview trochar was placed under direct visualization.  The abdomen was insufflated to 15 mm Hg. Laparoscope was placed and a 4-quadrant scan of the abdomen revealed normal anatomy. Next, two additional 5-millimeter ports were placed, one in the suprapubic position and one in the left lower quadrant position. Both were placed after adequate local anesthesia and under direct laparoscopic visualization. After placement of all ports the appendix was localized and grasped. The appendix was dilated and inflamed.  Next, the appendiceal base was clearly identified. We created a window in the appendiceal mesentery at the base of the appendix.  The mesoappendix and appendiceal artery were ligated with Ligasure device. Next, two endoloops were placed around the appendix and suture ligated.  The appendix was transected with Ligasure device.  At this point, the appendix was completely free. It was removed from the abdomen by way of the EndoCatch bag.   Suction irrigation was then used to irrigate the base. The appendiceal stump was evaluated and  hemostasis was confirmed.   Next,  laparoscopic ports were removed, under direct visualization and Pneumoperitoneum was released. Skin incisions were closed with 4-0 vicryl in a subcuticular fashion, and dermabond was applied.  The patient tolerated the procedure well and was transferred to the postanesthesia recovery room in stable condition.     Jamir Lopez D.O.   [facility]

## 2019-08-02 LAB — COPATH REPORT: NORMAL

## 2019-11-14 ENCOUNTER — OFFICE VISIT (OUTPATIENT)
Dept: FAMILY MEDICINE | Facility: CLINIC | Age: 39
End: 2019-11-14
Payer: COMMERCIAL

## 2019-11-14 VITALS
HEART RATE: 93 BPM | OXYGEN SATURATION: 100 % | TEMPERATURE: 98.4 F | BODY MASS INDEX: 27.85 KG/M2 | SYSTOLIC BLOOD PRESSURE: 120 MMHG | WEIGHT: 188 LBS | DIASTOLIC BLOOD PRESSURE: 74 MMHG | HEIGHT: 69 IN

## 2019-11-14 DIAGNOSIS — K76.9 LESION OF LIVER: ICD-10-CM

## 2019-11-14 DIAGNOSIS — Z76.89 ENCOUNTER TO ESTABLISH CARE: Primary | ICD-10-CM

## 2019-11-14 DIAGNOSIS — Z23 NEED FOR DIPHTHERIA-TETANUS-PERTUSSIS (TDAP) VACCINE: ICD-10-CM

## 2019-11-14 DIAGNOSIS — N50.819 TESTICULAR PAIN: ICD-10-CM

## 2019-11-14 DIAGNOSIS — M77.11 LATERAL EPICONDYLITIS OF RIGHT ELBOW: ICD-10-CM

## 2019-11-14 LAB
ALBUMIN UR-MCNC: NEGATIVE MG/DL
APPEARANCE UR: CLEAR
BILIRUB UR QL STRIP: NEGATIVE
COLOR UR AUTO: YELLOW
GLUCOSE UR STRIP-MCNC: NEGATIVE MG/DL
HGB UR QL STRIP: NEGATIVE
KETONES UR STRIP-MCNC: NEGATIVE MG/DL
LEUKOCYTE ESTERASE UR QL STRIP: NEGATIVE
NITRATE UR QL: NEGATIVE
PH UR STRIP: 7.5 PH (ref 5–7)
SOURCE: ABNORMAL
SP GR UR STRIP: 1.02 (ref 1–1.03)
UROBILINOGEN UR STRIP-ACNC: 0.2 EU/DL (ref 0.2–1)

## 2019-11-14 PROCEDURE — 36415 COLL VENOUS BLD VENIPUNCTURE: CPT | Performed by: INTERNAL MEDICINE

## 2019-11-14 PROCEDURE — 90715 TDAP VACCINE 7 YRS/> IM: CPT | Performed by: INTERNAL MEDICINE

## 2019-11-14 PROCEDURE — 86140 C-REACTIVE PROTEIN: CPT | Performed by: INTERNAL MEDICINE

## 2019-11-14 PROCEDURE — 90471 IMMUNIZATION ADMIN: CPT | Performed by: INTERNAL MEDICINE

## 2019-11-14 PROCEDURE — 81003 URINALYSIS AUTO W/O SCOPE: CPT | Performed by: INTERNAL MEDICINE

## 2019-11-14 PROCEDURE — 99204 OFFICE O/P NEW MOD 45 MIN: CPT | Mod: 25 | Performed by: INTERNAL MEDICINE

## 2019-11-14 PROCEDURE — 80053 COMPREHEN METABOLIC PANEL: CPT | Performed by: INTERNAL MEDICINE

## 2019-11-14 RX ORDER — NAPROXEN 500 MG/1
500 TABLET ORAL 2 TIMES DAILY PRN
Qty: 20 TABLET | Refills: 0 | Status: SHIPPED | OUTPATIENT
Start: 2019-11-14 | End: 2019-12-10

## 2019-11-14 ASSESSMENT — MIFFLIN-ST. JEOR: SCORE: 1748.14

## 2019-11-14 NOTE — PROGRESS NOTES
Subjective     Surya Cyr is a 39 year old male who presents to clinic today for the following health issues:    HPI   New Patient/Transfer of Care    Patient is presenting for [facility] care and discuss multiple concerns patient is presenting to [facility] care and discuss multiple concerns.  He underwent appendectomy in July he had a CT scan of his abdomen that showed liver lesions to 1.4 cm.  Patient denies any history of abdominal pain right upper quadrant abdominal pain or history of known liver disease no history of jaundice no history of diarrhea.  Serial trauma to the abdomen.  No history of any hematologic diseases.  3 of fever or chills.  No history of recurrent infections.  Also he had a history of vasectomy many years ago and since then has been complaining of achiness in his testicular area bilateral that has been progressive and worsening.  Any hematospermia denies any urinary symptoms.  In sports he describes some trauma to the area.  Has any history of flank pain denies any history of STDs.  Also he is being treated by physical therapy for lateral epicondylitis headedness history of distal arm fracture in the past and he had some wrist injury he fell recently and is been having pain radiating his right elbow he does play tennis as well he has some banding put on the right arm.  Denies any weakness or numbness per se        Chief Complaint   Patient presents with     [facility]     New Patient         Patient Active Problem List   Diagnosis     Acute appendicitis with localized peritonitis, without perforation, abscess, or gangrene     Lateral epicondylitis of right elbow     Lesion of liver     Testicular pain     Past Surgical History:   Procedure Laterality Date     LAPAROSCOPIC APPENDECTOMY N/A 7/31/2019    Procedure: APPENDECTOMY, LAPAROSCOPIC;  Surgeon: Jamir Lopez DO;  Location: [facility] OR     VASECTOMY         Social History     Tobacco Use     Smoking status: Never  Smoker     Smokeless tobacco: Never Used   Substance Use Topics     Alcohol use: Yes     Frequency: 2-4 times a month     Drinks per session: 3 or 4     Binge frequency: Never     Family History   Problem Relation Age of Onset     Brain Cancer Brother         AT AGE 14         Current Outpatient Medications   Medication Sig Dispense Refill     ibuprofen (ADVIL/MOTRIN) 800 MG tablet Take 1 tablet (800 mg) by mouth every 6 hours as needed for moderate pain or pain (mild) 30 tablet 0     naproxen (NAPROSYN) 500 MG tablet Take 1 tablet (500 mg) by mouth 2 times daily as needed for moderate pain Take with food and water 20 tablet 0   ALLERGIES:  Reviewed on EMR.    PAST MEDICAL HISTORY:  Right distal radius fracture, right Achilles tendinitis.    PAST SURGICAL HISTORY:  Negative.    SOCIAL HISTORY:  He is a  and enjoys sports including tennis and softball and golf and other outdoor activities and denies tobacco.      7/31/2019  CT abdomen                                                                   IMPRESSION:  1.  Findings suspicious for acute appendicitis with dilated and  enhancing tubular structure arising from the cecum likely representing  the appendix seen within the lower pelvis. No adjacent focal fluid  collection is seen.  2.  Several indeterminate hypoattenuating liver lesion seen measuring  up to 1.4 cm and the right hepatic lobe. Consider further evaluation  with liver MRI for further evaluation.    No Known Allergies    BP Readings from Last 3 Encounters:   11/14/19 120/74   07/31/19 105/70    Wt Readings from Last 3 Encounters:   11/14/19 85.3 kg (188 lb)   07/31/19 82.6 kg (182 lb)         Reviewed and updated as needed this visit by Provider  Tobacco  Meds  Med Hx  Surg Hx  Fam Hx  Soc Hx        Review of Systems   ROS COMP: Constitutional, HEENT, cardiovascular, pulmonary, GI, , musculoskeletal, neuro, skin, endocrine and psych systems are negative, except as otherwise  "noted.      Objective    /74 (BP Location: Right arm, Cuff Size: Adult Large)   Pulse 93   Temp 98.4  F (36.9  C) (Tympanic)   Ht 1.753 m (5' 9\")   Wt 85.3 kg (188 lb)   SpO2 100%   BMI 27.76 kg/m    Body mass index is 27.76 kg/m .  Physical Exam   GENERAL: healthy, alert and no distress  EYES: Eyes grossly normal to inspection, PERRL and conjunctivae and sclerae normal  HENT: ear canals and TM's normal, nose and mouth without ulcers or lesions  NECK: no adenopathy, no asymmetry, masses, or scars and thyroid normal to palpation  RESP: lungs clear to auscultation - no rales, rhonchi or wheezes  CV: regular rate and rhythm, normal S1 S2, no S3 or S4, no murmur, click or rub, no peripheral edema and peripheral pulses strong  ABDOMEN: soft, nontender, no hepatosplenomegaly, no masses and bowel sounds normal   (male): testicles normal without atrophy or masses, epididymal enlargement bilateral, no hernias and penis normal without urethral discharge  MS: no gross musculoskeletal defects noted, no edema  SKIN: no suspicious lesions or rashes  NEURO: Normal strength and tone, mentation intact and speech normal  PSYCH: mentation appears normal, affect normal/bright    Diagnostic Test Results:  Labs reviewed in Epic        Assessment & Plan     Surya was seen today for establish care and new patient.    Diagnoses and all orders for this visit:    Encounter to establish care    Testicular pain  -     UA reflex to Microscopic  -     US Testicular & Scrotum w Doppler Ltd; Future  -     UROLOGY ADULT REFERRAL  -     CRP, inflammation  -     Comprehensive metabolic panel  -     naproxen (NAPROSYN) 500 MG tablet; Take 1 tablet (500 mg) by mouth 2 times daily as needed for moderate pain Take with food and water    Need for diphtheria-tetanus-pertussis (Tdap) vaccine  -     TDAP VACCINE (ADACEL)  -          ADMIN VACCINE, FIRST    Lesion of liver  -     Comprehensive metabolic panel  -     US Abdomen Limited; " Future    Lateral epicondylitis of right elbow           See Patient Instructions    No follow-ups on file.    Josue Stephens MD  [facility]

## 2019-11-14 NOTE — PROGRESS NOTES
Prior to immunization administration, verified patients identity using patient s name and date of birth. Please see Immunization Activity for additional information.     Screening Questionnaire for Adult Immunization    Are you sick today?   No   Do you have allergies to medications, food, a vaccine component or latex?   No   Have you ever had a serious reaction after receiving a vaccination?   No   Do you have a long-term health problem with heart disease, lung disease, asthma, kidney disease, metabolic disease (e.g. diabetes), anemia, or other blood disorder?   No   Do you have cancer, leukemia, HIV/AIDS, or any other immune system problem?   No   In the past 3 months, have you taken medications that affect  your immune system, such as prednisone, other steroids, or anticancer drugs; drugs for the treatment of rheumatoid arthritis, Crohn s disease, or psoriasis; or have you had radiation treatments?   No   Have you had a seizure, or a brain or other nervous system problem?   No   During the past year, have you received a transfusion of blood or blood     products, or been given immune (gamma) globulin or antiviral drug?   No   For women: Are you pregnant or is there a chance you could become        pregnant during the next month?   No   Have you received any vaccinations in the past 4 weeks?   No     Immunization questionnaire answers were all negative.        Per orders of Dr. Stephens, injection of Tdap given by Yandy Crockett CMA. Patient instructed to remain in clinic for 15 minutes afterwards, and to report any adverse reaction to me immediately.       Screening performed by Yandy Crockett CMA on 11/14/2019 at 4:57 PM.

## 2019-11-14 NOTE — LETTER
[facility]  [address]  [address]  [place]  [address]  Tel: [phone]    November 19, 2019    Surya Cyr  [address] [facility] [address]        Dear Mr. Cyr,    Your labs were reviewed,  Electrolytes in blood and kidney function testing were normal.  Liver enzymes are normal.  Total protein was normal.  Calcium was normal.   inflammatory marker called CRP is normal less than 2.9  Urinalysis is normal; does not show any blood or signs of infection.  Any further questions I am happy to address  Please schedule the ultrasounds ordered and follow-up with urology as discussed.  Any further questions happy to address.      Dr. Stephens/CARLOS        Enclosure: Lab Results  Results for orders placed or performed in visit on 11/14/19   UA reflex to Microscopic     Status: Abnormal   Result Value Ref Range    Color Urine Yellow     Appearance Urine Clear     Glucose Urine Negative NEG^Negative mg/dL    Bilirubin Urine Negative NEG^Negative    Ketones Urine Negative NEG^Negative mg/dL    Specific Gravity Urine 1.020 1.003 - 1.035    Blood Urine Negative NEG^Negative    pH Urine 7.5 (H) 5.0 - 7.0 pH    Protein Albumin Urine Negative NEG^Negative mg/dL    Urobilinogen Urine 0.2 0.2 - 1.0 EU/dL    Nitrite Urine Negative NEG^Negative    Leukocyte Esterase Urine Negative NEG^Negative    Source Midstream Urine    CRP, inflammation     Status: None   Result Value Ref Range    CRP Inflammation <2.9 0.0 - 8.0 mg/L   Comprehensive metabolic panel     Status: None   Result Value Ref Range    Sodium 140 133 - 144 mmol/L    Potassium 4.1 3.4 - 5.3 mmol/L    Chloride 106 94 - 109 mmol/L    Carbon Dioxide 27 20 - 32 mmol/L    Anion Gap 7 3 - 14 mmol/L    Glucose 88 70 - 99 mg/dL    Urea Nitrogen 14 7 - 30 mg/dL    Creatinine 1.13 0.66 - 1.25 mg/dL    GFR Estimate 80 >60 mL/min/[1.73_m2]    GFR Estimate If Black >90 >60 mL/min/[1.73_m2]    Calcium 9.2 8.5 - 10.1 mg/dL    Bilirubin Total 0.8 0.2 - 1.3 mg/dL     Albumin 4.1 3.4 - 5.0 g/dL    Protein Total 7.4 6.8 - 8.8 g/dL    Alkaline Phosphatase 58 40 - 150 U/L    ALT 24 0 - 70 U/L    AST 6 0 - 45 U/L

## 2019-11-15 PROBLEM — M77.11 LATERAL EPICONDYLITIS OF RIGHT ELBOW: Status: ACTIVE | Noted: 2019-11-15

## 2019-11-15 PROBLEM — K76.9 LESION OF LIVER: Status: ACTIVE | Noted: 2019-11-15

## 2019-11-15 PROBLEM — N50.819 TESTICULAR PAIN: Status: ACTIVE | Noted: 2019-11-15

## 2019-11-15 LAB
ALBUMIN SERPL-MCNC: 4.1 G/DL (ref 3.4–5)
ALP SERPL-CCNC: 58 U/L (ref 40–150)
ALT SERPL W P-5'-P-CCNC: 24 U/L (ref 0–70)
ANION GAP SERPL CALCULATED.3IONS-SCNC: 7 MMOL/L (ref 3–14)
AST SERPL W P-5'-P-CCNC: 6 U/L (ref 0–45)
BILIRUB SERPL-MCNC: 0.8 MG/DL (ref 0.2–1.3)
BUN SERPL-MCNC: 14 MG/DL (ref 7–30)
CALCIUM SERPL-MCNC: 9.2 MG/DL (ref 8.5–10.1)
CHLORIDE SERPL-SCNC: 106 MMOL/L (ref 94–109)
CO2 SERPL-SCNC: 27 MMOL/L (ref 20–32)
CREAT SERPL-MCNC: 1.13 MG/DL (ref 0.66–1.25)
CRP SERPL-MCNC: <2.9 MG/L (ref 0–8)
GFR SERPL CREATININE-BSD FRML MDRD: 80 ML/MIN/{1.73_M2}
GLUCOSE SERPL-MCNC: 88 MG/DL (ref 70–99)
POTASSIUM SERPL-SCNC: 4.1 MMOL/L (ref 3.4–5.3)
PROT SERPL-MCNC: 7.4 G/DL (ref 6.8–8.8)
SODIUM SERPL-SCNC: 140 MMOL/L (ref 133–144)

## 2019-11-19 NOTE — RESULT ENCOUNTER NOTE
Please notify patient of the following lab results:  Denver London,  Your labs were reviewed,  Electrolytes in blood and kidney function testing were normal.  Liver enzymes are normal.  Total protein was normal.  Calcium was normal.   inflammatory marker called CRP is normal less than 2.9  Urinalysis is normal; does not show any blood or signs of infection.  Any further questions I am happy to address  Please schedule the ultrasounds ordered and follow-up with urology as discussed.  Any further questions happy to address  Dr. Stephens

## 2019-11-21 ENCOUNTER — HOSPITAL ENCOUNTER (OUTPATIENT)
Dept: ULTRASOUND IMAGING | Facility: CLINIC | Age: 39
End: 2019-11-21
Attending: INTERNAL MEDICINE
Payer: COMMERCIAL

## 2019-11-21 ENCOUNTER — HOSPITAL ENCOUNTER (OUTPATIENT)
Dept: ULTRASOUND IMAGING | Facility: CLINIC | Age: 39
Discharge: HOME | End: 2019-11-21
Attending: INTERNAL MEDICINE | Admitting: INTERNAL MEDICINE
Payer: COMMERCIAL

## 2019-11-21 DIAGNOSIS — K76.9 LESION OF LIVER: ICD-10-CM

## 2019-11-21 DIAGNOSIS — N50.819 TESTICULAR PAIN: ICD-10-CM

## 2019-11-21 PROCEDURE — 76705 ECHO EXAM OF ABDOMEN: CPT

## 2019-11-21 PROCEDURE — 93976 VASCULAR STUDY: CPT

## 2019-11-21 NOTE — LETTER
[address] [facility]  [address]  [place]  [address]  Tel: [phone]    November 25, 2019    Surya Cyr  [address] [facility] [address]        Dear Mr. Cyr,    Ultrasound of the liver shows no significant pathology there is mention of probable hemangiomata x2 up to 1.5 cm.  Radiologist thinks these are benign and recommends repeat ultrasound in 6 to 12 months to confirm stability of the lesions  Please note a liver hemangioma is a benign lump in the liver. These lumps consist of blood vessels and are usually harmless. An estimated 1-5 percent of adults in the [place] have small liver hemangiomas that cause no symptoms and do not need treatment. Larger hemangiomas can cause pain or discomfort  The rest of the exam including gallbladder, extrahepatic bile ducts, pancreas kidney right side are all normal-appearing.   Any further question happy to address.    Dr. Stephens/CARLOS

## 2019-11-25 ENCOUNTER — TELEPHONE (OUTPATIENT)
Dept: FAMILY MEDICINE | Facility: CLINIC | Age: 39
End: 2019-11-25

## 2019-11-25 DIAGNOSIS — I86.1 LEFT VARICOCELE: ICD-10-CM

## 2019-11-25 DIAGNOSIS — N50.819 TESTICULAR PAIN: Primary | ICD-10-CM

## 2019-11-25 NOTE — RESULT ENCOUNTER NOTE
Please notify patient with the following testicular ultrasound result  Surya,   Ultrasound of the testicles shows normal testicular size, there is mention of left-sided varicocele which are dilated veins.  This sometimes can cause pain.  There is also mention of bilateral epididymal head cysts usually are benign.  We can refer to urology if you approve to address the left-sided varicocele.  Dr. Stephens

## 2019-11-25 NOTE — RESULT ENCOUNTER NOTE
Please notify patient of the following right upper quadrant ultrasound of the abdomen.  Surya,  Ultrasound of the liver shows no significant pathology there is mention of probable hemangiomata x2 up to 1.5 cm.  Radiologist thinks these are benign and recommends repeat ultrasound in 6 to 12 months to confirm stability of the lesions  Please note a liver hemangioma is a benign lump in the liver. These lumps consist of blood vessels and are usually harmless. An estimated 1-5 percent of adults in the [place] have small liver hemangiomas that cause no symptoms and do not need treatment. Larger hemangiomas can cause pain or discomfort  The rest of the exam including gallbladder, extrahepatic bile ducts, pancreas kidney right side are all normal-appearing.    Any further question happy to address  Dr. Stephens

## 2019-11-27 NOTE — TELEPHONE ENCOUNTER
Pt is agreeable to urology referral. Please place.  Fatmata Mccall RN      Ok to leave vm on home phone number

## 2019-11-27 NOTE — TELEPHONE ENCOUNTER
Spoke to Surya - the number given in previous message is NOT his, he does not have a Step Father, and doesn't know who that number belongs to. I have updated the phone numbers for him in his chart. He is home now and would like a call back at his home phone number - [phone]. He has not received the letter sent to him on 11/21/19 regarding the abdominal US results, and would like to have it reviewed as well please.

## 2019-11-27 NOTE — TELEPHONE ENCOUNTER
Attempt #2.    Called patient at [phone] and left non-detailed message to return call to triage.  Please also confirm patient's phone number at the preferred number listed is not patient's number.    CHUY Pena, RN  [employer] [facility]

## 2019-11-29 ENCOUNTER — PRE VISIT (OUTPATIENT)
Dept: UROLOGY | Facility: CLINIC | Age: 39
End: 2019-11-29

## 2019-11-29 NOTE — TELEPHONE ENCOUNTER
Reason for Visit: Consult    Diagnosis: Left varicocele    Orders/Procedures/Records: in system    Contact Patient: n/a    Rooming Requirements: normal      Katty Brannon LPN  11/29/19  3:46 PM

## 2019-11-29 NOTE — TELEPHONE ENCOUNTER
MEDICAL RECORDS REQUEST   [facility]  Urology Clinic  [address] [place] [address]  PHONE: [phone]  Fax: [phone]        FUTURE VISIT INFORMATION                                                   Surya Cyr : 1980 scheduled for future visit at [facility]    APPOINTMENT INFORMATION:    Date: 19 1PM    Provider:  Ramon Carlisle MD    Reason for Visit/Diagnosis: Left Varicocele    REFERRAL INFORMATION:    Referring provider:  VARGHESE GARAY    Specialty: MD    Referring providers clinic:  [facility]    Clinic contact number:  [phone]    RECORDS REQUESTED FOR VISIT                                                     NOTES  STATUS/DETAILS   OFFICE NOTE from referring provider  yes   OFFICE NOTE from other specialist  no   DISCHARGE SUMMARY from hospital  no   DISCHARGE REPORT from the ER  no   OPERATIVE REPORT  no   MEDICATION LIST  yes     PRE-VISIT CHECKLIST      Record collection complete Yes   Appointment appropriately scheduled           (right time/right provider) Yes   MyChart activation If no, please explain: In process   Questionnaire complete If no, please explain: In process     Completed by: Ledy Santos

## 2019-12-01 ENCOUNTER — PRE VISIT (OUTPATIENT)
Dept: UROLOGY | Facility: CLINIC | Age: 39
End: 2019-12-01

## 2019-12-05 ENCOUNTER — TELEPHONE (OUTPATIENT)
Dept: FAMILY MEDICINE | Facility: CLINIC | Age: 39
End: 2019-12-05

## 2019-12-05 NOTE — TELEPHONE ENCOUNTER
"Lengthy discussion.  Has been flaring up for \"months\".  Gets a white area and some tenderness, then \"pops\" (drains) and recurs in a matter of weeks.  Not terribly bothersome unless \"inflamed\".     No known hemorrhoids but has a little \"skin tag\" that might rub on it and irritate it, causing frequent inflammation.    He will keep appt.12/10 (not bothersome at this moment).  Call sooner if significant inflammation so PCP can see it at that time.    Kierra Castellanos RN on 12/5/2019 at 3:32 PM    "

## 2019-12-05 NOTE — TELEPHONE ENCOUNTER
Reason for Call:  Other appointment    Detailed comments: Surya calling in to see if Dr. Stephens would be able to treat a pimple on his anus or would he recommend seeing a different doctor/specialist.     Please see if that is something Dr. Stephens can do per pt's request.    He's already schedule for 12/10/19.    Phone Number Patient can be reached at: Cell number on file:    Telephone Information:   Mobile [phone]       Best Time: ANY    Can we leave a detailed message on this number? YES    Call taken on 12/5/2019 at 1:04 PM by Hawk Worley

## 2019-12-06 ASSESSMENT — ENCOUNTER SYMPTOMS
SKIN CHANGES: 0
POOR WOUND HEALING: 1
NAIL CHANGES: 0

## 2019-12-10 ENCOUNTER — OFFICE VISIT (OUTPATIENT)
Dept: FAMILY MEDICINE | Facility: CLINIC | Age: 39
End: 2019-12-10
Payer: COMMERCIAL

## 2019-12-10 VITALS
BODY MASS INDEX: 26.81 KG/M2 | HEIGHT: 69 IN | WEIGHT: 181 LBS | TEMPERATURE: 97.3 F | SYSTOLIC BLOOD PRESSURE: 118 MMHG | OXYGEN SATURATION: 98 % | DIASTOLIC BLOOD PRESSURE: 76 MMHG | HEART RATE: 89 BPM

## 2019-12-10 DIAGNOSIS — K62.9 PERIANAL LESION: ICD-10-CM

## 2019-12-10 DIAGNOSIS — L98.9 SKIN LESION: Primary | ICD-10-CM

## 2019-12-10 PROCEDURE — 99213 OFFICE O/P EST LOW 20 MIN: CPT | Performed by: INTERNAL MEDICINE

## 2019-12-10 RX ORDER — MUPIROCIN 20 MG/G
OINTMENT TOPICAL 3 TIMES DAILY
Qty: 30 G | Refills: 0 | Status: SHIPPED | OUTPATIENT
Start: 2019-12-10

## 2019-12-10 ASSESSMENT — MIFFLIN-ST. JEOR: SCORE: 1716.39

## 2019-12-10 NOTE — PROGRESS NOTES
Subjective     Surya Cyr is a 39 year old male who presents to clinic today for the following health issues:    HPI     Recurring pimple right by rectum, wondering if he needs to see dermatologist or colorectal specialist    Patient presents for evaluation of left buttocks lesion lateral to the anus, feels like a permanent pimple that swells intermittently when it swells it drains purulent material , does feel uncomfortable when it swells, denies any pain ,denies any rectal bleed, denies any blood on stools or wipes; he describes feels like a skin tag as well in the rectal area, denies any diarrhea or abdominal pain or cramps or blood in the stools . no family history of IBD.      Patient Active Problem List   Diagnosis     Acute appendicitis with localized peritonitis, without perforation, abscess, or gangrene     Lateral epicondylitis of right elbow     Lesion of liver     Testicular pain     Past Surgical History:   Procedure Laterality Date     LAPAROSCOPIC APPENDECTOMY N/A 7/31/2019    Procedure: APPENDECTOMY, LAPAROSCOPIC;  Surgeon: Jamir Lopez DO;  Location: [facility] OR     VASECTOMY         Social History     Tobacco Use     Smoking status: Never Smoker     Smokeless tobacco: Never Used   Substance Use Topics     Alcohol use: Yes     Frequency: 2-4 times a month     Drinks per session: 3 or 4     Binge frequency: Never     Family History   Problem Relation Age of Onset     Brain Cancer Brother         AT AGE 14         Current Outpatient Medications   Medication Sig Dispense Refill     mupirocin (BACTROBAN) 2 % external ointment Apply topically 3 times daily 30 g 0     No Known Allergies  Recent Labs   Lab Test 11/14/19  1615 07/31/19  1538   ALT 24  --    CR 1.13 1.15   GFRESTIMATED 80 78   GFRESTBLACK >90 >90   POTASSIUM 4.1 3.7      BP Readings from Last 3 Encounters:   12/10/19 118/76   11/14/19 120/74   07/31/19 105/70    Wt Readings from Last 3 Encounters:   12/10/19 82.1 kg (181  "lb)   11/14/19 85.3 kg (188 lb)   07/31/19 82.6 kg (182 lb)                    Reviewed and updated as needed this visit by Provider         Review of Systems   ROS COMP: Constitutional, HEENT, cardiovascular, pulmonary, gi and gu systems are negative, except as otherwise noted.      Objective    Ht 1.753 m (5' 9\")   Wt 82.1 kg (181 lb)   BMI 26.73 kg/m    Body mass index is 26.73 kg/m .  Physical Exam   GENERAL: healthy, alert and no distress  ABDOMEN: soft, nontender, no hepatosplenomegaly, no masses and bowel sounds normal  Rectal area; there is a bit of erythema irritation at 12-3 o'clock Area ,?  Fistula , there lesion in the left buttocks perirectal area that is raised half centimeter skin colored lesion, ?fistulous tract,  With no surrounding erythema. No signs of cellulitis or infection.      Diagnostic Test Results:  Labs reviewed in Epic        Assessment & Plan   Problem List Items Addressed This Visit     None      Visit Diagnoses     Skin lesion    -  Primary    Relevant Medications    mupirocin (BACTROBAN) 2 % external ointment    Perianal lesion             Possible perirectal fistula that is draining I recommended that he sees general surgery for further evaluation, patient was hesitant to.  He is seeing urology this week for varicocele ; advised him to ask urology for eval.  I did recommend sitz bath with Epsen salt and warm water, , apply mupirocin cream on the area /lesion when it swells and drains..  I would recommend that he see his general surgery. also use Metamucil to keep stools soft,  keep well-hydrated to avoid any constipation.    BMI:   Estimated body mass index is 26.73 kg/m  as calculated from the following:    Height as of this encounter: 1.753 m (5' 9\").    Weight as of this encounter: 82.1 kg (181 lb).   Weight management plan: Discussed healthy diet and exercise guidelines        See Patient Instructions  No follow-ups on file.    Josue Stephens MD  [facility] " [facility]

## 2019-12-12 ENCOUNTER — OFFICE VISIT (OUTPATIENT)
Dept: UROLOGY | Facility: CLINIC | Age: 39
End: 2019-12-12
Payer: COMMERCIAL

## 2019-12-12 VITALS
HEIGHT: 69 IN | BODY MASS INDEX: 26.81 KG/M2 | HEART RATE: 83 BPM | DIASTOLIC BLOOD PRESSURE: 82 MMHG | WEIGHT: 181 LBS | SYSTOLIC BLOOD PRESSURE: 127 MMHG

## 2019-12-12 DIAGNOSIS — I86.1 VARICOCELE: Primary | ICD-10-CM

## 2019-12-12 DIAGNOSIS — N50.819 TESTIS PAIN: ICD-10-CM

## 2019-12-12 ASSESSMENT — MIFFLIN-ST. JEOR: SCORE: 1716.39

## 2019-12-12 ASSESSMENT — PAIN SCALES - GENERAL: PAINLEVEL: MILD PAIN (2)

## 2019-12-12 NOTE — PROGRESS NOTES
"We are pleased to see . Surya Cyr in consultation at the request of Josue Stephens for the evaluation of chief complaint listed below    CC:    Varicocele         History of Present Illness:   HPI: Surya Cyr is a(n) 39 year old MALE referred for evaluation of left varicocele    Had a vasectomy 2 years ago, since then felt that varicocele has become more pronounced. He has intermittent pain from the varicocele. Pain is worse with sitting, feels like an aching pain. Sitting tends to exaggerate the pain, exercise does not make it worse. Does not know if anything makes the pain better.     He has his appendix out this summer, no complications. Denies any history of hernias.            Past Medical History:     Past Medical History:   Diagnosis Date     Epididymitis, left 11/20/2019    this was from the ultra-sound visit            Past Surgical History:     Past Surgical History:   Procedure Laterality Date     LAPAROSCOPIC APPENDECTOMY N/A 7/31/2019    Procedure: APPENDECTOMY, LAPAROSCOPIC;  Surgeon: Jamir Lopez DO;  Location: [facility] OR     VASECTOMY       VASECTOMY  11/2017          Family History:     Family History   Problem Relation Age of Onset     Brain Cancer Brother         AT AGE 14     Cancer Brother         brain stem tumor, passed at age 14.          Allergies:   No Known Allergies         Medications:     Current Outpatient Medications   Medication Sig     mupirocin (BACTROBAN) 2 % external ointment Apply topically 3 times daily (Patient not taking: Reported on 12/12/2019)     No current facility-administered medications for this visit.             REVIEW OF SYSTEMS:    See HPI for pertinent details.  Remainder of 10-point ROS negative.         PHYSICAL EXAM   /82 (BP Location: Right arm, Patient Position: Sitting, Cuff Size: Adult Regular)   Pulse 83   Ht 1.753 m (5' 9\")   Wt 82.1 kg (181 lb)   BMI 26.73 kg/m    GENERAL: No acute distress. Well nourished. "   HEENT:  Sclerae anicteric.  Conjunctivae pink.  Moist mucous membranes.  NECK:  Supple.   CARDIAC:  Hemodynamically stable  LUNGS:  Non-labored breathing   EXAM:  Phallus circumcised, meatus adequate, no plaques palpated.   Left testis descended , size is 15 cc , consistency is normal. No intra-testicular masses.  Right testis descended , size is 15 cc , consistency is normal. No intra-testicular masses.   Epididymes present, right side tender, mildly enlarged, consistent with cysts demonstrated on ultrasound.   Left cord: Vas present. Grade 2 varicocele noted.  Right cord: Vas present. No varicocele noted.     SKIN: No rashes.  Dry.     EXTREMITIES:  Warm, well perfused.  No lower extremity edema bilaterally.  NEURO: CN grossly intact, no focal deficits.           LABS AND IMAGING:   Scrotal US:  IMPRESSION:   1. Left varicocele.  2. Bilateral epididymal head cysts.          ASSESSMENT:   Surya Cyr is a 39 year old male who presents for evaluation of Left varicocele.    Discussed risks, benefits, and alternatives of varix repair, including various methods.  I counseled him extensively on the nature of varicoceles, and their possible effects on testosterone production and fertility.  I described surgery and embolization approaches, and the detailed risks of surgical repair.  These include damage to artery (ischemia), damage to lymphatics ( hydrocele), as well as risk of recurrence (</= 1%). Discussed that about 2/3rds of men see improved semen parameters and that improvement takes at least 3 months to see.          PLAN:     Patient will undergo varicocelectomy when ready to address symptoms/when pain is severe enough to pursue surgery or embolization.        Masoud White MD MS  Urology PGY-1      I saw and examined the patient with the resident today.  I agree with the resident note and plan of care as above.     Ramon Carlisle MD  Urology Staff

## 2019-12-12 NOTE — LETTER
12/12/2019       RE: Surya Cyr  [address] [facility] [address]     Dear Colleague,    Thank you for referring your patient, Surya Cyr, to the [facility] UR[facility] AND [facility] PROSTATE AND UROLOGIC CANCERS at [facility]. Please see a copy of my visit note below.    We are pleased to see . Surya Cyr in consultation at the request of Josue Stephens for the evaluation of chief complaint listed below    CC:    Varicocele         History of Present Illness:   HPI: Surya Cyr is a(n) 39 year old MALE referred for evaluation of left varicocele    Had a vasectomy 2 years ago, since then felt that varicocele has become more pronounced. He has intermittent pain from the varicocele. Pain is worse with sitting, feels like an aching pain. Sitting tends to exaggerate the pain, exercise does not make it worse. Does not know if anything makes the pain better.     He has his appendix out this summer, no complications. Denies any history of hernias.            Past Medical History:     Past Medical History:   Diagnosis Date     Epididymitis, left 11/20/2019    this was from the ultra-sound visit            Past Surgical History:     Past Surgical History:   Procedure Laterality Date     LAPAROSCOPIC APPENDECTOMY N/A 7/31/2019    Procedure: APPENDECTOMY, LAPAROSCOPIC;  Surgeon: Jamir Lopez DO;  Location: [facility] OR     VASECTOMY       VASECTOMY  11/2017          Family History:     Family History   Problem Relation Age of Onset     Brain Cancer Brother         AT AGE 14     Cancer Brother         brain stem tumor, passed at age 14.          Allergies:   No Known Allergies         Medications:     Current Outpatient Medications   Medication Sig     mupirocin (BACTROBAN) 2 % external ointment Apply topically 3 times daily (Patient not taking: Reported on 12/12/2019)     No current facility-administered medications for this visit.             REVIEW OF  "SYSTEMS:    See HPI for pertinent details.  Remainder of 10-point ROS negative.         PHYSICAL EXAM   /82 (BP Location: Right arm, Patient Position: Sitting, Cuff Size: Adult Regular)   Pulse 83   Ht 1.753 m (5' 9\")   Wt 82.1 kg (181 lb)   BMI 26.73 kg/m     GENERAL: No acute distress. Well nourished.   HEENT:  Sclerae anicteric.  Conjunctivae pink.  Moist mucous membranes.  NECK:  Supple.   CARDIAC:  Hemodynamically stable  LUNGS:  Non-labored breathing   EXAM:  Phallus circumcised, meatus adequate, no plaques palpated.   Left testis descended , size is 15 cc , consistency is normal. No intra-testicular masses.  Right testis descended , size is 15 cc , consistency is normal. No intra-testicular masses.   Epididymes present, right side tender, mildly enlarged, consistent with cysts demonstrated on ultrasound.   Left cord: Vas present. Grade 2 varicocele noted.  Right cord: Vas present. No varicocele noted.     SKIN: No rashes.  Dry.     EXTREMITIES:  Warm, well perfused.  No lower extremity edema bilaterally.  NEURO: CN grossly intact, no focal deficits.           LABS AND IMAGING:   Scrotal US:  IMPRESSION:   1. Left varicocele.  2. Bilateral epididymal head cysts.          ASSESSMENT:   Surya Cyr is a 39 year old male who presents for evaluation of Left varicocele.    Discussed risks, benefits, and alternatives of varix repair, including various methods.  I counseled him extensively on the nature of varicoceles, and their possible effects on testosterone production and fertility.  I described surgery and embolization approaches, and the detailed risks of surgical repair.  These include damage to artery (ischemia), damage to lymphatics ( hydrocele), as well as risk of recurrence (</= 1%). Discussed that about 2/3rds of men see improved semen parameters and that improvement takes at least 3 months to see.          PLAN:     Patient will undergo varicocelectomy when ready to address symptoms/when " pain is severe enough to pursue surgery or embolization.        Masoud White MD MS  Urology PGY-1      I saw and examined the patient with the resident today.  I agree with the resident note and plan of care as above.     Ramon Carlisle MD  Urology Staff

## 2019-12-12 NOTE — NURSING NOTE
"Chief Complaint   Patient presents with     Consult     Left Varicocele       Height 1.753 m (5' 9\"), weight 82.1 kg (181 lb). Body mass index is 26.73 kg/m .    Patient Active Problem List   Diagnosis     Acute appendicitis with localized peritonitis, without perforation, abscess, or gangrene     Lateral epicondylitis of right elbow     Lesion of liver     Testicular pain       No Known Allergies    Current Outpatient Medications   Medication Sig Dispense Refill     mupirocin (BACTROBAN) 2 % external ointment Apply topically 3 times daily (Patient not taking: Reported on 12/12/2019) 30 g 0       Social History     Tobacco Use     Smoking status: Never Smoker     Smokeless tobacco: Never Used   Substance Use Topics     Alcohol use: Yes     Frequency: 2-4 times a month     Drinks per session: 3 or 4     Binge frequency: Never     Drug use: Never       Katty Brannon LPN  12/12/2019  1:08 PM  "

## 2020-03-11 ENCOUNTER — HEALTH MAINTENANCE LETTER (OUTPATIENT)
Age: 40
End: 2020-03-11

## 2021-01-03 ENCOUNTER — HEALTH MAINTENANCE LETTER (OUTPATIENT)
Age: 41
End: 2021-01-03

## 2021-04-25 ENCOUNTER — HEALTH MAINTENANCE LETTER (OUTPATIENT)
Age: 41
End: 2021-04-25

## 2021-10-10 ENCOUNTER — HEALTH MAINTENANCE LETTER (OUTPATIENT)
Age: 41
End: 2021-10-10

## 2022-05-21 ENCOUNTER — HEALTH MAINTENANCE LETTER (OUTPATIENT)
Age: 42
End: 2022-05-21

## 2022-09-18 ENCOUNTER — HEALTH MAINTENANCE LETTER (OUTPATIENT)
Age: 42
End: 2022-09-18

## 2023-06-04 ENCOUNTER — HEALTH MAINTENANCE LETTER (OUTPATIENT)
Age: 43
End: 2023-06-04

## (undated) DEVICE — SOL NACL 0.9% IRRIG 1000ML BOTTLE 2F7124

## (undated) DEVICE — ENDO POUCH UNIVERSAL RETRIEVAL SYSTEM INZII 5MM CD003

## (undated) DEVICE — LINEN HALF SHEET 5512

## (undated) DEVICE — SYSTEM CLEARIFY VISUALIZATION 21-345

## (undated) DEVICE — ENDO TROCAR FIRST ENTRY KII FIOS Z-THRD 05X100MM CTF03

## (undated) DEVICE — DRSG GAUZE 4X4" 8044

## (undated) DEVICE — ESU GROUND PAD ADULT W/CORD E7507

## (undated) DEVICE — BAG CLEAR TRASH 1.3M 39X33" P4040C

## (undated) DEVICE — Device

## (undated) DEVICE — DECANTER VIAL 2006S

## (undated) DEVICE — GOWN IMPERVIOUS ZONED XLG 9041

## (undated) DEVICE — GLOVE PROTEXIS POWDER FREE 7.5 ORTHOPEDIC 2D73ET75

## (undated) DEVICE — ENDO TROCAR SLEEVE KII Z-THREADED 05X100MM CTS02

## (undated) DEVICE — LINEN TOWEL PACK X10 5473

## (undated) DEVICE — ESU CORD MONOPOLAR 10'  E0510

## (undated) DEVICE — GLOVE PROTEXIS BLUE W/NEU-THERA 8.0  2D73EB80

## (undated) DEVICE — LINEN FULL SHEET 5511

## (undated) DEVICE — SU VICRYL 4-0 PS-2 18" UND J496H

## (undated) DEVICE — LINEN POUCH DBL 5427

## (undated) DEVICE — SU PDS II 0 ENDOLOOP EZ10G

## (undated) RX ORDER — BUPIVACAINE HYDROCHLORIDE AND EPINEPHRINE 5; 5 MG/ML; UG/ML
INJECTION, SOLUTION EPIDURAL; INTRACAUDAL; PERINEURAL
Status: DISPENSED
Start: 2019-07-31

## (undated) RX ORDER — OXYCODONE HYDROCHLORIDE 5 MG/1
TABLET ORAL
Status: DISPENSED
Start: 2019-07-31